# Patient Record
Sex: MALE | Race: WHITE | Employment: PART TIME | URBAN - NONMETROPOLITAN AREA
[De-identification: names, ages, dates, MRNs, and addresses within clinical notes are randomized per-mention and may not be internally consistent; named-entity substitution may affect disease eponyms.]

---

## 2021-09-08 ENCOUNTER — HOSPITAL ENCOUNTER (EMERGENCY)
Age: 46
Discharge: HOME | End: 2021-09-08
Attending: EMERGENCY MEDICINE
Payer: OTHER GOVERNMENT

## 2021-09-08 VITALS
RESPIRATION RATE: 18 BRPM | DIASTOLIC BLOOD PRESSURE: 102 MMHG | HEIGHT: 70 IN | WEIGHT: 202 LBS | TEMPERATURE: 98.3 F | BODY MASS INDEX: 28.92 KG/M2 | OXYGEN SATURATION: 99 % | HEART RATE: 94 BPM | SYSTOLIC BLOOD PRESSURE: 173 MMHG

## 2021-09-08 DIAGNOSIS — H60.502 ACUTE OTITIS EXTERNA OF LEFT EAR, UNSPECIFIED TYPE: Primary | ICD-10-CM

## 2021-09-08 PROCEDURE — 74011250637 HC RX REV CODE- 250/637: Performed by: EMERGENCY MEDICINE

## 2021-09-08 PROCEDURE — 99283 EMERGENCY DEPT VISIT LOW MDM: CPT

## 2021-09-08 RX ORDER — IBUPROFEN 800 MG/1
800 TABLET ORAL
Status: COMPLETED | OUTPATIENT
Start: 2021-09-08 | End: 2021-09-08

## 2021-09-08 RX ORDER — OFLOXACIN 3 MG/ML
10 SOLUTION AURICULAR (OTIC) DAILY
Qty: 5 ML | Refills: 0 | Status: SHIPPED | OUTPATIENT
Start: 2021-09-08 | End: 2021-09-15

## 2021-09-08 RX ADMIN — IBUPROFEN 800 MG: 800 TABLET, FILM COATED ORAL at 09:02

## 2021-09-08 NOTE — ED PROVIDER NOTES
EMERGENCY DEPARTMENT HISTORY AND PHYSICAL EXAM      Date: 9/8/2021  Patient Name: She Naik. History of Presenting Illness     Chief Complaint   Patient presents with    Ear Pain       History Provided By: Patient    HPI: She Naik., 54 y.o. male with a past medical history significant No significant past medical history presents to the ED with cc of left ear pain for 4 days made worse today after he got water in his ear, patient denies swimming 4 days ago does admit to him having his finger in his ear regularly, pain intensity 7/10    There are no other complaints, changes, or physical findings at this time. PCP: No primary care provider on file. No current facility-administered medications on file prior to encounter. No current outpatient medications on file prior to encounter. Past History     Past Medical History:  No past medical history on file. Past Surgical History:  No past surgical history on file. Family History:  No family history on file. Social History:  Social History     Tobacco Use    Smoking status: Not on file   Substance Use Topics    Alcohol use: Not on file    Drug use: Not on file       Allergies: Allergies   Allergen Reactions    Dilantin [Phenytoin Sodium Extended] Other (comments)         Review of Systems     Review of Systems   Constitutional: Negative for chills and fever. HENT: Positive for ear pain. Negative for ear discharge and sore throat. Eyes: Negative for pain and visual disturbance. Respiratory: Negative for cough and shortness of breath. Cardiovascular: Negative for chest pain and leg swelling. Gastrointestinal: Negative for abdominal pain and vomiting. Endocrine: Negative for polydipsia and polyuria. Genitourinary: Negative for dysuria and urgency. Musculoskeletal: Negative for back pain and neck pain. Skin: Negative for color change and pallor. Neurological: Negative for weakness and numbness. Psychiatric/Behavioral: Negative. Physical Exam     Physical Exam  Vitals and nursing note reviewed. Constitutional:       General: He is not in acute distress. Appearance: Normal appearance. He is not ill-appearing or toxic-appearing. HENT:      Head: Normocephalic and atraumatic. Right Ear: Tympanic membrane and ear canal normal.      Left Ear: Decreased hearing noted. Swelling and tenderness present. No drainage. No mastoid tenderness. Nose: Nose normal.      Mouth/Throat:      Mouth: Mucous membranes are moist.      Pharynx: Oropharynx is clear. Eyes:      Extraocular Movements: Extraocular movements intact. Conjunctiva/sclera: Conjunctivae normal.      Pupils: Pupils are equal, round, and reactive to light. Cardiovascular:      Rate and Rhythm: Normal rate and regular rhythm. Pulses: Normal pulses. Heart sounds: Normal heart sounds. Pulmonary:      Effort: Pulmonary effort is normal.      Breath sounds: Normal breath sounds. Abdominal:      General: Bowel sounds are normal.      Palpations: Abdomen is soft. Musculoskeletal:         General: No swelling or tenderness. Normal range of motion. Cervical back: Normal range of motion and neck supple. Skin:     General: Skin is warm and dry. Capillary Refill: Capillary refill takes less than 2 seconds. Neurological:      General: No focal deficit present. Mental Status: He is alert and oriented to person, place, and time. Psychiatric:         Mood and Affect: Mood normal.         Behavior: Behavior normal.         Lab and Diagnostic Study Results     Labs -   No results found for this or any previous visit (from the past 12 hour(s)). Radiologic Studies -   @lastxrresult@  CT Results  (Last 48 hours)    None        CXR Results  (Last 48 hours)    None            Medical Decision Making   - I am the first provider for this patient.     - I reviewed the vital signs, available nursing notes, past medical history, past surgical history, family history and social history. - Initial assessment performed. The patients presenting problems have been discussed, and they are in agreement with the care plan formulated and outlined with them. I have encouraged them to ask questions as they arise throughout their visit. Vital Signs-Reviewed the patient's vital signs. Patient Vitals for the past 12 hrs:   Temp Pulse Resp BP SpO2   09/08/21 0852 98.3 °F (36.8 °C) 94 18 (!) 173/102 99 %       Records Reviewed: Nursing Notes    The patient presents with ear pain with a differential diagnosis of otitis media otitis externa impacted cerumen      ED Course:          Provider Notes (Medical Decision Making): MDM       Procedures   Medical Decision Makingedical Decision Making  Performed by: Keshav Dumont MD  PROCEDURES:  Procedures       Disposition   Disposition: Condition stable and improved  DC- Adult Discharges: All of the diagnostic tests were reviewed and questions answered. Diagnosis, care plan and treatment options were discussed. The patient understands the instructions and will follow up as directed. The patients results have been reviewed with them. They have been counseled regarding their diagnosis. The patient verbally convey understanding and agreement of the signs, symptoms, diagnosis, treatment and prognosis and additionally agrees to follow up as recommended with their PCP in 24 - 48 hours. They also agree with the care-plan and convey that all of their questions have been answered. I have also put together some discharge instructions for them that include: 1) educational information regarding their diagnosis, 2) how to care for their diagnosis at home, as well a 3) list of reasons why they would want to return to the ED prior to their follow-up appointment, should their condition change. DISCHARGE PLAN:  1. There are no discharge medications for this patient.     2.   Follow-up Information    None       3. Return to ED if worse   4. There are no discharge medications for this patient. Diagnosis     Clinical Impression: No diagnosis found. Attestations:    Catrachita Verde MD    Please note that this dictation was completed with [employer], the computer voice recognition software. Quite often unanticipated grammatical, syntax, homophones, and other interpretive errors are inadvertently transcribed by the computer software. Please disregard these errors. Please excuse any errors that have escaped final proofreading. Thank you.

## 2021-09-08 NOTE — ED TRIAGE NOTES
Pt reports left ear pain x 1 day--worse this AM---denies any drainage-- states \"I cannot hear anything at all. \"

## 2022-03-26 ENCOUNTER — HOSPITAL ENCOUNTER (EMERGENCY)
Age: 47
Discharge: HOME | End: 2022-03-26
Attending: EMERGENCY MEDICINE
Payer: OTHER GOVERNMENT

## 2022-03-26 VITALS
HEIGHT: 70 IN | SYSTOLIC BLOOD PRESSURE: 170 MMHG | WEIGHT: 204 LBS | HEART RATE: 98 BPM | RESPIRATION RATE: 18 BRPM | DIASTOLIC BLOOD PRESSURE: 113 MMHG | BODY MASS INDEX: 29.2 KG/M2 | OXYGEN SATURATION: 99 % | TEMPERATURE: 97.8 F

## 2022-03-26 DIAGNOSIS — H61.22 IMPACTED CERUMEN OF LEFT EAR: Primary | ICD-10-CM

## 2022-03-26 DIAGNOSIS — H60.502 ACUTE OTITIS EXTERNA OF LEFT EAR, UNSPECIFIED TYPE: ICD-10-CM

## 2022-03-26 PROCEDURE — 99283 EMERGENCY DEPT VISIT LOW MDM: CPT

## 2022-03-26 RX ORDER — NEOMYCIN SULFATE, POLYMYXIN B SULFATE AND HYDROCORTISONE 10; 3.5; 1 MG/ML; MG/ML; [USP'U]/ML
3 SUSPENSION/ DROPS AURICULAR (OTIC) 4 TIMES DAILY
Qty: 10 ML | Refills: 0 | Status: SHIPPED | OUTPATIENT
Start: 2022-03-26

## 2022-03-26 NOTE — ED PROVIDER NOTES
HPI 40-year-old male with no significant medical history presents the ED complaining of left ear pain beginning 2 days ago worse last night. Similar problem September 2021 diagnosed with otitis externa at that time. No other complaints    No past medical history on file. No past surgical history on file. No family history on file. Social History     Socioeconomic History    Marital status: SINGLE     Spouse name: Not on file    Number of children: Not on file    Years of education: Not on file    Highest education level: Not on file   Occupational History    Not on file   Tobacco Use    Smoking status: Not on file    Smokeless tobacco: Not on file   Substance and Sexual Activity    Alcohol use: Not on file    Drug use: Not on file    Sexual activity: Not on file   Other Topics Concern    Not on file   Social History Narrative    Not on file     Social Determinants of Health     Financial Resource Strain:     Difficulty of Paying Living Expenses: Not on file   Food Insecurity:     Worried About Running Out of Food in the Last Year: Not on file    Kemar of Food in the Last Year: Not on file   Transportation Needs:     Lack of Transportation (Medical): Not on file    Lack of Transportation (Non-Medical):  Not on file   Physical Activity:     Days of Exercise per Week: Not on file    Minutes of Exercise per Session: Not on file   Stress:     Feeling of Stress : Not on file   Social Connections:     Frequency of Communication with Friends and Family: Not on file    Frequency of Social Gatherings with Friends and Family: Not on file    Attends Mu-ism Services: Not on file    Active Member of Clubs or Organizations: Not on file    Attends Club or Organization Meetings: Not on file    Marital Status: Not on file   Intimate Partner Violence:     Fear of Current or Ex-Partner: Not on file    Emotionally Abused: Not on file    Physically Abused: Not on file    Sexually Abused: Not on file   Housing Stability:     Unable to Pay for Housing in the Last Year: Not on file    Number of Places Lived in the Last Year: Not on file    Unstable Housing in the Last Year: Not on file         ALLERGIES: Dilantin [phenytoin sodium extended]    Review of Systems   All other systems reviewed and are negative. Vitals:    03/26/22 0853   BP: (!) 170/113   Pulse: 98   Resp: 18   Temp: 97.8 °F (36.6 °C)   SpO2: 99%   Weight: 92.5 kg (204 lb)   Height: 5' 10\" (1.778 m)            Physical Exam  Vitals and nursing note reviewed. Constitutional:       General: He is not in acute distress. Appearance: Normal appearance. He is normal weight. He is not ill-appearing or toxic-appearing. HENT:      Head: Normocephalic and atraumatic. Right Ear: Tympanic membrane and ear canal normal.      Left Ear: There is impacted cerumen. Nose: Nose normal.      Mouth/Throat:      Mouth: Mucous membranes are moist.   Eyes:      Conjunctiva/sclera: Conjunctivae normal.      Pupils: Pupils are equal, round, and reactive to light. Musculoskeletal:      Cervical back: Normal range of motion and neck supple. No rigidity or tenderness. Lymphadenopathy:      Cervical: No cervical adenopathy. Skin:     General: Skin is warm and dry. Neurological:      General: No focal deficit present. Mental Status: He is alert and oriented to person, place, and time. Psychiatric:         Mood and Affect: Mood normal.         Behavior: Behavior normal.          MDM some cerumen removed with cerumen spoon by myself; irrigation by nursing.     After irrigation large chunk was removed with cerumen curet; moderate inflammation of the external canal TM appears normal and intact       Procedures

## 2022-03-26 NOTE — ED NOTES
Pt tolerated irrigation well with excessive amount of cerumen removed pt reported he can hear much better now

## 2022-03-26 NOTE — ED TRIAGE NOTES
Patient reports left ear pain/congestion that started 1.5 days ago. Reports hx of ear infections.   Denies sore throat or fever

## 2024-12-28 ENCOUNTER — APPOINTMENT (OUTPATIENT)
Facility: HOSPITAL | Age: 49
End: 2024-12-28
Attending: EMERGENCY MEDICINE
Payer: OTHER GOVERNMENT

## 2024-12-28 ENCOUNTER — HOSPITAL ENCOUNTER (EMERGENCY)
Facility: HOSPITAL | Age: 49
Discharge: HOME | End: 2024-12-28
Attending: EMERGENCY MEDICINE
Payer: OTHER GOVERNMENT

## 2024-12-28 VITALS
TEMPERATURE: 98 F | RESPIRATION RATE: 18 BRPM | HEIGHT: 70 IN | BODY MASS INDEX: 29.2 KG/M2 | DIASTOLIC BLOOD PRESSURE: 86 MMHG | SYSTOLIC BLOOD PRESSURE: 120 MMHG | HEART RATE: 90 BPM | OXYGEN SATURATION: 99 % | WEIGHT: 204 LBS

## 2024-12-28 DIAGNOSIS — S82.842A CLOSED BIMALLEOLAR FRACTURE OF LEFT ANKLE, INITIAL ENCOUNTER: Primary | ICD-10-CM

## 2024-12-28 PROCEDURE — 29515 APPLICATION SHORT LEG SPLINT: CPT

## 2024-12-28 PROCEDURE — 6370000000 HC RX 637 (ALT 250 FOR IP): Performed by: EMERGENCY MEDICINE

## 2024-12-28 PROCEDURE — 99283 EMERGENCY DEPT VISIT LOW MDM: CPT

## 2024-12-28 PROCEDURE — 73610 X-RAY EXAM OF ANKLE: CPT

## 2024-12-28 RX ORDER — ACETAMINOPHEN 500 MG
1000 TABLET ORAL
Status: DISCONTINUED | OUTPATIENT
Start: 2024-12-28 | End: 2024-12-28

## 2024-12-28 RX ORDER — IBUPROFEN 600 MG/1
600 TABLET, FILM COATED ORAL
Status: COMPLETED | OUTPATIENT
Start: 2024-12-28 | End: 2024-12-28

## 2024-12-28 RX ORDER — HYDROCODONE BITARTRATE AND ACETAMINOPHEN 5; 325 MG/1; MG/1
1 TABLET ORAL
Status: COMPLETED | OUTPATIENT
Start: 2024-12-28 | End: 2024-12-28

## 2024-12-28 RX ORDER — HYDROCODONE BITARTRATE AND ACETAMINOPHEN 5; 325 MG/1; MG/1
1 TABLET ORAL EVERY 6 HOURS PRN
Qty: 21 TABLET | Refills: 0 | Status: SHIPPED | OUTPATIENT
Start: 2024-12-28 | End: 2025-01-02

## 2024-12-28 RX ADMIN — HYDROCODONE BITARTRATE AND ACETAMINOPHEN 1 TABLET: 5; 325 TABLET ORAL at 08:32

## 2024-12-28 RX ADMIN — IBUPROFEN 600 MG: 600 TABLET ORAL at 08:21

## 2024-12-28 ASSESSMENT — PAIN - FUNCTIONAL ASSESSMENT
PAIN_FUNCTIONAL_ASSESSMENT: INTOLERABLE, UNABLE TO DO ANY ACTIVE OR PASSIVE ACTIVITIES
PAIN_FUNCTIONAL_ASSESSMENT: 0-10

## 2024-12-28 ASSESSMENT — LIFESTYLE VARIABLES
HOW OFTEN DO YOU HAVE A DRINK CONTAINING ALCOHOL: NEVER
HOW MANY STANDARD DRINKS CONTAINING ALCOHOL DO YOU HAVE ON A TYPICAL DAY: PATIENT DOES NOT DRINK

## 2024-12-28 ASSESSMENT — PAIN DESCRIPTION - DESCRIPTORS: DESCRIPTORS: ACHING

## 2024-12-28 ASSESSMENT — PAIN DESCRIPTION - ORIENTATION: ORIENTATION: LEFT

## 2024-12-28 ASSESSMENT — PAIN SCALES - GENERAL
PAINLEVEL_OUTOF10: 10
PAINLEVEL_OUTOF10: 0

## 2024-12-28 ASSESSMENT — PAIN DESCRIPTION - LOCATION: LOCATION: ANKLE

## 2024-12-28 NOTE — ED NOTES
Discharged home to self.  Wheelchair out of ED.  VS WDL.  0 s/s acute distress.  Respirations even and unlabored.  Discharge instructions and follow up care reviewed.  Patient receptive and demonstrated knowledge of instruction via teach-back method.  Instruction given on crutch walking.

## 2024-12-28 NOTE — DISCHARGE INSTRUCTIONS
You were seen in the emergency room for your ankle fracture.  This is an unstable ankle fracture, so we have put it in a splint, but it may need surgery in the next week.  Follow-up with the orthopedic surgeon we have given you.    You can take tylenol or ibuprofen for aches and pains for the next few days. If needed, you can alternate these every 3 hours so that you always have something on board. For instance, you can take tylenol at 9am, ibuprofen at noon, tylenol again at 3pm and ibuprofen again at 6pm. Take in plenty of water to stay hydrated.    Save the narcotic pain medication we have given you for severe pain or pain preventing sleep.    Follow-up with the orthopedic surgeon next week for definitive treatment, which may include surgery.  Return to the emergency room for any new or worsening pain, or any other new or concerning symptoms.        Thank you for choosing our Emergency Department for your care.  It is our privilege to care for you in your time of need.  In the next several days, you may receive a survey via email or mailed to your home about your experience with our team.  We would greatly appreciate you taking a few minutes to complete the survey, as we use this information to learn what we have done well and what we could be doing better. Thank you for trusting us with your care!    Below you will find a list of your tests from today's visit.   Labs and Radiology Studies  No results found for this or any previous visit (from the past 12 hour(s)).  XR ANKLE LEFT (MIN 3 VIEWS)    Result Date: 12/28/2024  EXAM: XR ANKLE LEFT (MIN 3 VIEWS) INDICATION: pain, twisted. COMPARISON: None. FINDINGS: Three views of the left ankle demonstrate acute displaced fractures of the medial malleolus and distal fibula at the level of the syndesmosis. There is widening of the medial ankle mortise. There is marked soft tissue swelling about the ankle.      1. Acute displaced fractures of the medial malleolus and

## 2024-12-28 NOTE — ED TRIAGE NOTES
Pt reported he fell into a gofer hole last night twisting his left ankle pt unable to bear weight in triage

## 2024-12-28 NOTE — ED PROVIDER NOTES
[facility] EMERGENCY DEPT  EMERGENCY DEPARTMENT HISTORY AND PHYSICAL EXAM      Date: 12/28/2024  Patient Name: Morro Michaud Jr.  MRN: [id number]  Birthdate 1975  Date of evaluation: 12/28/2024  Provider: Nghia Fowler MD   Note Started: 8:21 AM EST 12/28/24    HISTORY OF PRESENT ILLNESS     Chief Complaint   Patient presents with    Ankle Injury       History Provided By: patient    HPI: Morro Michaud Jr. is a 49 y.o. male with PMH HTN presenting with ankle injury. Stepped in a gopher hole around 2 or 3 AM, twisted his ankle. Endorsing pain to ankle. Denies head trauma, not on AC.    PAST MEDICAL HISTORY   Past Medical History:  Past Medical History:   Diagnosis Date    Hypertension        Past Surgical History:  History reviewed. No pertinent surgical history.    Family History:  History reviewed. No pertinent family history.    Social History:  Social History     Tobacco Use    Smoking status: Every Day     Types: Cigarettes    Smokeless tobacco: Never   Substance Use Topics    Alcohol use: Yes       Allergies:  Allergies   Allergen Reactions    Phenytoin Sodium Extended Other (See Comments)       PCP: No primary care provider on file.    Current Meds:   No current facility-administered medications for this encounter.     Current Outpatient Medications   Medication Sig Dispense Refill    HYDROcodone-acetaminophen (NORCO) 5-325 MG per tablet Take 1 tablet by mouth every 6 hours as needed for Pain (severe pain) for up to 5 days. Intended supply: 3 days. Take lowest dose possible to manage pain Max Daily Amount: 4 tablets 21 tablet 0    neomycin-polymyxin-hydrocortisone (CORTISPORIN) 3.5-94816-9 otic suspension Place 3 drops in ear(s) 4 times daily         Social Determinants of Health:   Social Determinants of Health     Tobacco Use: High Risk (12/28/2024)    Patient History     Smoking Tobacco Use: Every Day     Smokeless Tobacco Use: Never     Passive Exposure: Not on file   Alcohol Use: Not At Risk (12/28/2024)     XR ANKLE LEFT (MIN 3 VIEWS)  Bimalleolar (?tri) ankle fx on my read, will splint and consult ortho. [YA]   0917 XR ANKLE LEFT (MIN 3 VIEWS)    IMPRESSION:  1. Acute displaced fractures of the medial malleolus and distal fibula. Widening  of the medial ankle mortise, suggestive of ligamentous injury. Soft tissue  swelling about the ankle.     Electronically signed by Sanjay Gonzales        Exam Ended: 12/28/24 08:40 EST [YA]   0925 Spoke to ortho Dr. Augustus Streeter, agrees with splint and can follow up in clinic this week. [YA]      ED Course User Index  [YA] Nghia Fowler MD       Patient was given the following medications:  Medications   ibuprofen (ADVIL;MOTRIN) tablet 600 mg (600 mg Oral Given 12/28/24 0821)   HYDROcodone-acetaminophen (NORCO) 5-325 MG per tablet 1 tablet (1 tablet Oral Given 12/28/24 0832)       CONSULTS: See ED Course/MDM for further details.  Orthopedic Dr. Augustus Streeter       PROCEDURES   Unless otherwise noted above, none    A posterior leg and sugar tong splint was applied by nurse Noel and proper placement verified by myself at 10:01 AM.    CRITICAL CARE TIME   N/a     ED IMPRESSION     1. Closed bimalleolar fracture of left ankle, initial encounter          DISPOSITION/PLAN   Discharge     PATIENT REFERRED TO:  Augustus Streeter MD  [address] [place] [id number]  [phone]    In 1 week          DISCHARGE MEDICATIONS:     Medication List        START taking these medications      HYDROcodone-acetaminophen 5-325 MG per tablet  Commonly known as: Norco  Take 1 tablet by mouth every 6 hours as needed for Pain (severe pain) for up to 5 days. Intended supply: 3 days. Take lowest dose possible to manage pain Max Daily Amount: 4 tablets            ASK your doctor about these medications      neomycin-polymyxin-hydrocortisone 3.5-01491-7 otic suspension  Commonly known as: CORTISPORIN               Where to Get Your Medications        These medications were sent to

## 2025-04-04 ENCOUNTER — HOSPITAL ENCOUNTER (OUTPATIENT)
Facility: HOSPITAL | Age: 50
Setting detail: RECURRING SERIES
Discharge: HOME | End: 2025-04-07
Payer: OTHER GOVERNMENT

## 2025-04-04 PROCEDURE — 97110 THERAPEUTIC EXERCISES: CPT

## 2025-04-04 PROCEDURE — 97162 PT EVAL MOD COMPLEX 30 MIN: CPT

## 2025-04-04 PROCEDURE — 97140 MANUAL THERAPY 1/> REGIONS: CPT

## 2025-04-04 NOTE — THERAPY EVALUATION
[facility], [address]  [place] [address]  Ph: [phone]     Fax: [phone]          PHYSICAL THERAPY - EVALUATION/PLAN OF CARE NOTE (updated 3/23)      Date: 2025          Patient Name:  Morro Michaud Jr. :  1975   Medical   Diagnosis:  Encounter for other orthopedic aftercare [Z47.89] Treatment Diagnosis:  M25.572 LEFT ANKLE PAIN and pain in the joint of the left foot  and M79.662  Pain in left lower leg    Referral Source:  Roshan Nobles MD Provider #:  [id number]                Insurance: Payor: [employer] / Plan: [employer] / Product Type: *No Product type* /      Patient  verified yes     Visit #   Current  / Total 1 15   Time   In / Out 11:23 am 12:23 pm   Total Treatment Time 60   Total Timed Codes 20         SUBJECTIVE  Pain Level (0-10 scale): L ankle pain 2/10 worst 8/10  []constant []intermittent [x]improving []worsening []no change since onset    Any medication changes, allergies to medications, adverse drug reactions, diagnosis change, or new procedure performed?: [x] No    [] Yes (see summary sheet for update)  Medications: Verified on Patient Summary List    Subjective functional status/changes:         Start of Care: 2025  Onset Date: 2024  Current symptoms/Complaints: L ankle pain (burning over medial malleolus), cramping lateral aspect of L leg  Mechanism of Injury: Fall - stepped down from a high step, pt rolled his L ankle (inversion) he went to the ER, had 3 fractures - placed in a cast and sent home. He saw Dr. Nobles 3 days later and he had surgery 1/10/2025 (ORIF). He had a follow up on 3/18 and he was told he could start weight bearing with boot on. Next follow up on .  x-rays shows well placed ORIF hardware with the trimalleolar fracture in good alignment and healing well   PLOF: IADLs without L ankle pain  Limitations to PLOF/Activity or Recreational Limitations: Walking, bathing,

## 2025-04-23 ENCOUNTER — HOSPITAL ENCOUNTER (OUTPATIENT)
Facility: HOSPITAL | Age: 50
Setting detail: RECURRING SERIES
Discharge: HOME | End: 2025-04-26
Payer: OTHER GOVERNMENT

## 2025-04-23 PROCEDURE — 97110 THERAPEUTIC EXERCISES: CPT

## 2025-04-23 PROCEDURE — 97140 MANUAL THERAPY 1/> REGIONS: CPT

## 2025-04-23 NOTE — PROGRESS NOTES
PHYSICAL THERAPY - DAILY TREATMENT NOTE (updated 3/23)      Date: 2025          Patient Name:  Morro Michaud Jr. :  1975   Medical   Diagnosis:  Encounter for other orthopedic aftercare [Z47.89] Treatment Diagnosis:  M25.572 LEFT ANKLE PAIN and pain in the joint of the left foot  and M79.662  Pain in left lower leg    Referral Source:  Roshan Nobles MD Insurance:   Payor: [employer] / Plan: [employer] / Product Type: *No Product type* /                     Patient  verified yes     Visit #   Current  / Total 2 15   Time   In / Out 03:00 pm 4:00   Total Treatment Time 55   Total Timed Codes 45         SUBJECTIVE    Pain Level (0-10 scale): 3/10    Any medication changes, allergies to medications, adverse drug reactions, diagnosis change, or new procedure performed?: [x] No    [] Yes (see summary sheet for update)  Medications: Verified on Patient Summary List    Subjective functional status/changes:     Patient concerned about R LE swelling and causing pitted edema. He stated he will contact his PCP. He reports swelling to R LE started approximately 3 weeks ago. He also has swelling to L LE but R is greater. He reports PCP gave him medication for his heart but he has not seen a Cardiologist. Also discussed possibly transferring to [facility] outpatient  PT due to him living in [place].    OBJECTIVE      Therapeutic Procedures:  Tx Min Billable or 1:1 Min (if diff from Tx Min) Procedure, Rationale, Specifics   35  39790 Therapeutic Exercise (timed):  increase ROM, strength, coordination, balance, and proprioception to improve patient's ability to progress to PLOF and address remaining functional goals. (see flow sheet as applicable)     Details if applicable:       03856 Neuromuscular Re-Education (timed):  improve balance, coordination, kinesthetic sense, posture, core stability and proprioception to improve patient's ability to develop conscious control of individual muscles and

## 2025-04-25 ENCOUNTER — APPOINTMENT (OUTPATIENT)
Facility: HOSPITAL | Age: 50
End: 2025-04-25
Payer: OTHER GOVERNMENT

## 2025-04-30 ENCOUNTER — HOSPITAL ENCOUNTER (OUTPATIENT)
Facility: HOSPITAL | Age: 50
Setting detail: RECURRING SERIES
Discharge: HOME | End: 2025-05-03
Payer: OTHER GOVERNMENT

## 2025-04-30 PROCEDURE — 97110 THERAPEUTIC EXERCISES: CPT

## 2025-04-30 PROCEDURE — 97140 MANUAL THERAPY 1/> REGIONS: CPT

## 2025-04-30 NOTE — PROGRESS NOTES
PHYSICAL THERAPY - DAILY TREATMENT NOTE (updated 3/23)      Date: 2025          Patient Name:  Morro Michaud Jr. :  1975   Medical   Diagnosis:  Encounter for other orthopedic aftercare [Z47.89] Treatment Diagnosis:  M25.572 LEFT ANKLE PAIN and pain in the joint of the left foot  and M79.662  Pain in left lower leg    Referral Source:  Roshan Nobles MD Insurance:   Payor: [employer] / Plan: [employer] / Product Type: *No Product type* /                     Patient  verified yes     Visit #   Current  / Total 3 15   Time   In / Out 01:45 pm 02:54 pm   Total Treatment Time 55   Total Timed Codes 45         SUBJECTIVE    Pain Level (0-10 scale): 3/10    Any medication changes, allergies to medications, adverse drug reactions, diagnosis change, or new procedure performed?: [x] No    [] Yes (see summary sheet for update)  Medications: Verified on Patient Summary List    Subjective functional status/changes:     Patient stated he [facility]. His MD is out of town and he was referred to the ER. He came in reporting he has been ambulating at home some without boot. He stated his L foot had increase swelling and pain and now his R LE is hurting at groin area. \"This has been going on the last 3 days ( R LE pain).\"         OBJECTIVE      Therapeutic Procedures:  Tx Min Billable or 1:1 Min (if diff from Tx Min) Procedure, Rationale, Specifics   35  34841 Therapeutic Exercise (timed):  increase ROM, strength, coordination, balance, and proprioception to improve patient's ability to progress to PLOF and address remaining functional goals. (see flow sheet as applicable)     Details if applicable:       13433 Neuromuscular Re-Education (timed):  improve balance, coordination, kinesthetic sense, posture, core stability and proprioception to improve patient's ability to develop conscious control of individual muscles and awareness of position of extremities in order to

## 2025-05-02 ENCOUNTER — HOSPITAL ENCOUNTER (OUTPATIENT)
Facility: HOSPITAL | Age: 50
Setting detail: RECURRING SERIES
Discharge: HOME | End: 2025-05-05
Payer: OTHER GOVERNMENT

## 2025-05-02 PROCEDURE — 97530 THERAPEUTIC ACTIVITIES: CPT

## 2025-05-02 NOTE — PROGRESS NOTES
to address functional deficits and attain remaining goals.    Progress toward goals / Updated goals:  []  See Progress Note/Recertification  Short Term Goals: To be accomplished in 7 treatments.   Pt will be independent with HEP to assist with progression of therapy and prevent delays/soreness.           [x] Met [] Not met [] Partially met  Date:4/24 initial HEP      Pt will use ice/heat/massage as instructed to assist with inflammation, swelling,and pain management to prevent pain > 2/10 on VAS.      [] Met [] Not met [] Partially met  Date:      Pt will use proper positioning such as elevation of the LE to prevent or decrease swelling.   [] Met [] Not met [] Partially met  Date:      Pt will be compliant with brace/boot/shoe as recommended for joint protection and safe gait.   [] Met [] Not met [] Partially met  Date:      Long Term Goals: To be accomplished in 15 treatments.  Pt will have improved AMPAC score by 10%.        [] Met [] Not met [] Partially met Date:      Pt performs 6 MWT for 800 feet safely with LRAD without verbal or increased pain so they can ambulate community distances without ankle pain or fear of falling, to get groceries, medications.       [] Met [] Not met [] Partially met Date:       Pt can SLS on involved side for > 5 seconds with 0 HHA for improved balance in stance phase to promote safe walking with no limp on all sufaces, like walking through the yard.       [] Met [] Not met [] Partially met Date:      Pt will be able to stand/march 2 minutes with 0-1 HHA on foam/mini-trampoline in order to navigate uneven terrain without pain or instability, like walking in a yard safely.      [] Met [] Not met [] Partially met Date:      Pt will have sufficient ankle ROM and strength to be able to go up and down steps and curbs with 0-1 HHA  in a normal pattern without pain or difficulty for safe community ambulation.      [] Met [] Not met [] Partially met Date:     Pt will have less swelling by

## 2025-05-07 ENCOUNTER — HOSPITAL ENCOUNTER (OUTPATIENT)
Facility: HOSPITAL | Age: 50
Setting detail: RECURRING SERIES
End: 2025-05-07
Payer: OTHER GOVERNMENT

## 2025-05-14 ENCOUNTER — HOSPITAL ENCOUNTER (OUTPATIENT)
Facility: HOSPITAL | Age: 50
Setting detail: RECURRING SERIES
Discharge: HOME | End: 2025-05-17
Payer: OTHER GOVERNMENT

## 2025-05-14 PROCEDURE — 97110 THERAPEUTIC EXERCISES: CPT

## 2025-05-16 ENCOUNTER — HOSPITAL ENCOUNTER (OUTPATIENT)
Facility: HOSPITAL | Age: 50
Setting detail: RECURRING SERIES
Discharge: HOME | End: 2025-05-19
Payer: OTHER GOVERNMENT

## 2025-05-16 PROCEDURE — 97110 THERAPEUTIC EXERCISES: CPT | Performed by: PHYSICAL THERAPIST

## 2025-05-16 PROCEDURE — 97140 MANUAL THERAPY 1/> REGIONS: CPT | Performed by: PHYSICAL THERAPIST

## 2025-05-16 NOTE — PROGRESS NOTES
have limited A/PROM and swelling in the L ankle, has improved since SOC. Advised pt to continue to ice and elevate at home for swelling and pain control and continue HEP.  Will progress as tolerated, patient will see MD next week.    Patient will continue to benefit from skilled PT / OT services to analyze and address functional mobility deficits, analyze and address ROM deficits, analyze and address strength deficits, analyze and address soft tissue restrictions, and instruct in home and community integration to address functional deficits and attain remaining goals.  Progress toward goals / Updated goals:  []  See Progress Note/Recertification  Short Term Goals: To be accomplished in 7 treatments.   Pt will be independent with HEP to assist with progression of therapy and prevent delays/soreness.           [x] Met [] Not met [] Partially met  Date:4/24 initial HEP      Pt will use ice/heat/massage as instructed to assist with inflammation, swelling,and pain management to prevent pain > 2/10 on VAS.      [] Met [] Not met [x] Partially met  Date: 5/14 encouraged ice use      Pt will use proper positioning such as elevation of the LE to prevent or decrease swelling.   [x] Met [] Not met [] Partially met  Date: 5/14 states he elevates every night       Pt will be compliant with brace/boot/shoe as recommended for joint protection and safe gait.   [] Met [] Not met [x] Partially met  Date: 5/14     Long Term Goals: To be accomplished in 15 treatments.  Pt will have improved AMPAC score by 10%.        [] Met [] Not met [] Partially met Date:      Pt performs 6 MWT for 800 feet safely with LRAD without verbal or increased pain so they can ambulate community distances without ankle pain or fear of falling, to get groceries, medications.       [] Met [] Not met [] Partially met Date:       Pt can SLS on involved side for > 5 seconds with 0 HHA for improved balance in stance phase to promote safe walking with no limp on all

## 2025-05-21 ENCOUNTER — HOSPITAL ENCOUNTER (OUTPATIENT)
Facility: HOSPITAL | Age: 50
Setting detail: RECURRING SERIES
Discharge: HOME | End: 2025-05-24
Payer: OTHER GOVERNMENT

## 2025-05-21 PROCEDURE — 97110 THERAPEUTIC EXERCISES: CPT

## 2025-05-21 NOTE — PROGRESS NOTES
PHYSICAL THERAPY - DAILY TREATMENT NOTE (updated 3/23)      Date: 2025          Patient Name:  Morro Michaud Jr. :  1975   Medical   Diagnosis:  Encounter for other orthopedic aftercare [Z47.89] Treatment Diagnosis:  M25.572 LEFT ANKLE PAIN and pain in the joint of the left foot  and M79.662  Pain in left lower leg    Referral Source:  Roshan Nobles MD Insurance:   Payor: [employer] / Plan: [employer] / Product Type: *No Product type* /                     Patient  verified yes     Visit #   Current  / Total 7 15   Time   In / Out 1:50pm 2:40pm   Total Treatment Time 50   Total Timed Codes 40         SUBJECTIVE    Pain Level (0-10 scale): 3/10  L ankle    Any medication changes, allergies to medications, adverse drug reactions, diagnosis change, or new procedure performed?: [x] No    [] Yes (see summary sheet for update)  Medications: Verified on Patient Summary List    Subjective functional status/changes:     Pt reports continued L ankle pain.  Pt reports he saw MD yesterday who Dcd CAM boot and stated he will be having another surgery \"in a few months\" to remove hardware- has follow up appt scheduled in August. Completing HEP every other day.         OBJECTIVE      Therapeutic Procedures:  Tx Min Billable or 1:1 Min (if diff from Tx Min) Procedure, Rationale, Specifics   40  27179 Therapeutic Exercise (timed):  increase ROM, strength, coordination, balance, and proprioception to improve patient's ability to progress to PLOF and address remaining functional goals. (see flow sheet as applicable)     Details if applicable:       57968 Neuromuscular Re-Education (timed):  improve balance, coordination, kinesthetic sense, posture, core stability and proprioception to improve patient's ability to develop conscious control of individual muscles and awareness of position of extremities in order to progress to PLOF and address remaining functional goals. (see flow sheet as applicable)     Details

## 2025-05-23 ENCOUNTER — APPOINTMENT (OUTPATIENT)
Facility: HOSPITAL | Age: 50
End: 2025-05-23
Payer: OTHER GOVERNMENT

## 2025-05-30 ENCOUNTER — HOSPITAL ENCOUNTER (OUTPATIENT)
Facility: HOSPITAL | Age: 50
Setting detail: RECURRING SERIES
End: 2025-05-30
Payer: OTHER GOVERNMENT

## 2025-05-30 PROCEDURE — 97110 THERAPEUTIC EXERCISES: CPT

## 2025-05-30 NOTE — PROGRESS NOTES
PHYSICAL THERAPY - DAILY TREATMENT NOTE (updated 3/23)      Date: 2025          Patient Name:  Morro Michaud Jr. :  1975   Medical   Diagnosis:  Encounter for other orthopedic aftercare [Z47.89] Treatment Diagnosis:  M25.572 LEFT ANKLE PAIN and pain in the joint of the left foot  and M79.662  Pain in left lower leg    Referral Source:  Roshan Nobles MD Insurance:   Payor: [employer] / Plan: [employer] / Product Type: *No Product type* /                     Patient  verified yes     Visit #   Current  / Total 8 15   Time   In / Out 1:00 pm 01:50 pm   Total Treatment Time 50   Total Timed Codes 40         SUBJECTIVE    Pain Level (0-10 scale): 2/10  L ankle    Any medication changes, allergies to medications, adverse drug reactions, diagnosis change, or new procedure performed?: [x] No    [] Yes (see summary sheet for update)  Medications: Verified on Patient Summary List    Subjective functional status/changes:     Pt stated he just had an ultrasound this morning to his R foot.        OBJECTIVE      Therapeutic Procedures:  Tx Min Billable or 1:1 Min (if diff from Tx Min) Procedure, Rationale, Specifics   40  94939 Therapeutic Exercise (timed):  increase ROM, strength, coordination, balance, and proprioception to improve patient's ability to progress to PLOF and address remaining functional goals. (see flow sheet as applicable)     Details if applicable:       33632 Neuromuscular Re-Education (timed):  improve balance, coordination, kinesthetic sense, posture, core stability and proprioception to improve patient's ability to develop conscious control of individual muscles and awareness of position of extremities in order to progress to PLOF and address remaining functional goals. (see flow sheet as applicable)     Details if applicable:       65564 Manual Therapy (timed):  decrease pain, increase ROM, increase tissue extensibility, and decrease trigger points to improve patient's ability to

## 2025-06-12 ENCOUNTER — APPOINTMENT (OUTPATIENT)
Facility: HOSPITAL | Age: 50
End: 2025-06-12
Payer: OTHER GOVERNMENT

## 2025-06-24 ENCOUNTER — HOSPITAL ENCOUNTER (OUTPATIENT)
Facility: HOSPITAL | Age: 50
Setting detail: RECURRING SERIES
Discharge: HOME | End: 2025-06-27
Payer: OTHER GOVERNMENT

## 2025-06-24 PROCEDURE — 97110 THERAPEUTIC EXERCISES: CPT

## 2025-06-24 NOTE — PROGRESS NOTES
PHYSICAL THERAPY - DAILY TREATMENT NOTE (updated 3/23)      Date: 2025          Patient Name:  Morro Michaud Jr. :  1975   Medical   Diagnosis:  Encounter for other orthopedic aftercare [Z47.89] Treatment Diagnosis:  M25.572 LEFT ANKLE PAIN and pain in the joint of the left foot  and M79.662  Pain in left lower leg    Referral Source:  Roshan Nobles MD Insurance:   Payor: [employer] / Plan: [employer] / Product Type: *No Product type* /                     Patient  verified yes     Visit #   Current  / Total 9 15   Time   In / Out 10:00 am 10:55 am   Total Treatment Time 50   Total Timed Codes 50         SUBJECTIVE    Pain Level (0-10 scale): 0/10  L ankle    Any medication changes, allergies to medications, adverse drug reactions, diagnosis change, or new procedure performed?: [x] No    [] Yes (see summary sheet for update)  Medications: Verified on Patient Summary List    Subjective functional status/changes:     Patient has completed another month of therapy. He has only been seen 9 times since SOC. He is reporting 50% improvement with pain levels at best 0/10, worse 4/10 . He was cleared off the boot and does not have any ankle support (brace). He is ambulating without an AD and antalgic gait. He reports he is able to ascend/descend stairs using reciprocal pattern and handrails. He does however often uses step to pattern due to pain. He is also concerned about ambulating with limp. He states the screws on medial aspect of ankle causes most of his pain. He has a follow up appointment with MD Aug. 20th and will be going out of town from  to .       OBJECTIVE      Therapeutic Procedures:  Tx Min Billable or 1:1 Min (if diff from Tx Min) Procedure, Rationale, Specifics   50  67691 Therapeutic Exercise (timed):  increase ROM, strength, coordination, balance, and proprioception to improve patient's ability to progress to PLOF and address remaining functional goals. (see flow sheet 
types, to decrease swelling and pain, and to address impairments in order to meet functional goals.   Patient will continue to benefit from skilled PT / OT services to analyze and address functional mobility deficits, analyze and address ROM deficits, analyze and address strength deficits, analyze and address soft tissue restrictions, and instruct in home and community integration to address functional deficits and attain remaining goals.       Swelling:   Measured in cm R  At eval  L   Malleolar 30 cm  30.5   Figure 8 58.5cm 59 cm        Specific joints: *normal values in ()  ANKLE                         AROM      AROM                             R L   Dorsiflexion (15)  15 10   Plantarflexion (50) 40  35   Inversion (35)  25 20   Eversion (25) 20 15   Additional comments:        Six Minute Walk Test: Distance in feet: 1127 ft       Asst Device: none  Additional comments: pain 4/10 slight antalgic gait      Single limb stand:              R: <3-5 sec                  L: <3-5 sec       Objective/Functional Outcome Measure: Basic Mobility  AM-PAC: 31.09%  ( initial 46.78%)  AM-PAC score = an established functional score where 0% = no disability         Progress toward goals / Updated goals:  []  See Progress Note/Recertification  Short Term Goals: To be accomplished in 7 treatments.   Pt will be independent with HEP to assist with progression of therapy and prevent delays/soreness.           [x] Met [] Not met [] Partially met  Date:4/24 initial HEP      Pt will use ice/heat/massage as instructed to assist with inflammation, swelling,and pain management to prevent pain > 2/10 on VAS.      [x] Met [] Not met [] Partially met  Date: 6/24 elevates leg mainly      Pt will use proper positioning such as elevation of the LE to prevent or decrease swelling.   [x] Met [] Not met [] Partially met  Date: 5/14 states he elevates every night       Pt will be compliant with brace/boot/shoe as recommended for joint protection and

## 2025-07-09 ENCOUNTER — HOSPITAL ENCOUNTER (OUTPATIENT)
Facility: HOSPITAL | Age: 50
Setting detail: RECURRING SERIES
Discharge: HOME | End: 2025-07-12
Payer: OTHER GOVERNMENT

## 2025-07-09 PROCEDURE — 97110 THERAPEUTIC EXERCISES: CPT | Performed by: PHYSICAL THERAPIST

## 2025-07-09 NOTE — PROGRESS NOTES
PHYSICAL THERAPY - DAILY TREATMENT NOTE (updated 3/23)      Date: 2025          Patient Name:  Morro Michaud Jr. :  1975   Medical   Diagnosis:  Encounter for other orthopedic aftercare [Z47.89] Treatment Diagnosis:  M25.572 LEFT ANKLE PAIN and pain in the joint of the left foot  and M79.662  Pain in left lower leg    Referral Source:  Roshan Nobles MD Insurance:   Payor: [employer] / Plan: [employer] / Product Type: *No Product type* /                     Patient  verified yes     Visit #   Current  / Total 10 15   Time   In / Out 10:17 am 11:00 am   Total Treatment Time 42   Total Timed Codes 42         SUBJECTIVE    Pain Level (0-10 scale): 1/10  L ankle, medial aspect    Any medication changes, allergies to medications, adverse drug reactions, diagnosis change, or new procedure performed?: [x] No    [] Yes (see summary sheet for update)  Medications: Verified on Patient Summary List    Subjective functional status/changes:     Patient reports mild pain upon arrival, rating at 1/10.  Reports he purchased an ankle support brace that he wore while he was out of town and feels that was helpful, but states he forgot it today.  Reports he still has to go down the stairs one step at time often due to limited mobility of the ankle and states he has difficulty walking up an incline.       OBJECTIVE      Therapeutic Procedures:  Tx Min Billable or 1:1 Min (if diff from Tx Min) Procedure, Rationale, Specifics   42  28481 Therapeutic Exercise (timed):  increase ROM, strength, coordination, balance, and proprioception to improve patient's ability to progress to PLOF and address remaining functional goals. (see flow sheet as applicable)     Details if applicable:       63599 Neuromuscular Re-Education (timed):  improve balance, coordination, kinesthetic sense, posture, core stability and proprioception to improve patient's ability to develop conscious control of individual muscles and awareness of

## 2025-07-18 ENCOUNTER — HOSPITAL ENCOUNTER (OUTPATIENT)
Facility: HOSPITAL | Age: 50
Setting detail: RECURRING SERIES
Discharge: HOME | End: 2025-07-21
Payer: OTHER GOVERNMENT

## 2025-07-18 PROCEDURE — 97110 THERAPEUTIC EXERCISES: CPT

## 2025-07-18 NOTE — PROGRESS NOTES
PHYSICAL THERAPY - DAILY TREATMENT NOTE (updated 3/23)      Date: 2025          Patient Name:  Morro Michaud Jr. :  1975   Medical   Diagnosis:  Encounter for other orthopedic aftercare [Z47.89] Treatment Diagnosis:  M25.572 LEFT ANKLE PAIN and pain in the joint of the left foot  and M79.662  Pain in left lower leg    Referral Source:  Roshan Nobles MD Insurance:   Payor: [employer] / Plan: [employer] / Product Type: *No Product type* /                     Patient  verified yes     Visit #   Current  / Total 11 15   Time   In / Out 10:45 am 11:30 am   Total Treatment Time 45   Total Timed Codes 45         SUBJECTIVE    Pain Level (0-10 scale): 2/10  L ankle, medial aspect    Any medication changes, allergies to medications, adverse drug reactions, diagnosis change, or new procedure performed?: [x] No    [] Yes (see summary sheet for update)  Medications: Verified on Patient Summary List    Subjective functional status/changes:     Patient states he forgot his ankle brace today. \"I can see the difference when I don't wear it.\"      OBJECTIVE      Therapeutic Procedures:  Tx Min Billable or 1:1 Min (if diff from Tx Min) Procedure, Rationale, Specifics   45  50233 Therapeutic Exercise (timed):  increase ROM, strength, coordination, balance, and proprioception to improve patient's ability to progress to PLOF and address remaining functional goals. (see flow sheet as applicable)     Details if applicable:       92976 Neuromuscular Re-Education (timed):  improve balance, coordination, kinesthetic sense, posture, core stability and proprioception to improve patient's ability to develop conscious control of individual muscles and awareness of position of extremities in order to progress to PLOF and address remaining functional goals. (see flow sheet as applicable)     Details if applicable:       68226 Manual Therapy (timed):  decrease pain, increase ROM, increase tissue extensibility, and decrease